# Patient Record
Sex: FEMALE | Race: OTHER | HISPANIC OR LATINO | Employment: UNEMPLOYED | URBAN - METROPOLITAN AREA
[De-identification: names, ages, dates, MRNs, and addresses within clinical notes are randomized per-mention and may not be internally consistent; named-entity substitution may affect disease eponyms.]

---

## 2022-02-14 PROBLEM — R30.0 DYSURIA: Status: ACTIVE | Noted: 2022-02-14

## 2022-02-14 PROBLEM — R59.0 AXILLARY LYMPHADENOPATHY: Status: ACTIVE | Noted: 2022-02-14

## 2022-02-14 PROBLEM — N64.4 BREAST PAIN: Status: ACTIVE | Noted: 2022-02-14

## 2022-02-14 PROBLEM — N89.8 VAGINAL DISCHARGE: Status: ACTIVE | Noted: 2022-02-14

## 2023-09-06 ENCOUNTER — OUTSIDE PLACE OF SERVICE (OUTPATIENT)
Dept: PEDIATRIC CARDIOLOGY | Facility: CLINIC | Age: 23
End: 2023-09-06

## 2023-09-06 NOTE — PROGRESS NOTES
[facility]    OB: Dr. Giorgi Hernández  MFM: Dr. Malachi Cazares    [facility]  Today, I had the pleasure of evaluating Briana Dia who is now 23 y.o. and carrying her second pregnancy at 24w2d gestation with an RODERICK of 2023. She was referred for evaluation of the fetal heart due to a suspected ventricular septal defect.      She is carrying a male fetus, named Exalte.      Obstetric History:    .  Her OB history is otherwise unremarkable.     No past medical history on file.      Current Outpatient Medications:     butalbital-acetaminophen-caffeine -40 mg (FIORICET, ESGIC) -40 mg per tablet, Take 1 tablet by mouth every 4 (four) hours as needed for Pain., Disp: 30 tablet, Rfl: 3    ondansetron (ZOFRAN-ODT) 4 MG TbDL, Take 1 tablet (4 mg total) by mouth every 6 (six) hours as needed., Disp: 30 tablet, Rfl: 5    Family History: Negative for congenital heart disease, early coronary artery disease, sudden unexplained death, connective tissues disorders, genetic syndromes, multiple miscarriages or other congenital anomalies.      FETAL ECHOCARDIOGRAM (summary):  S,D,S  Grossly structurally normal fetal heart with normally related great arteries.   There is a normal fetal foramen ovale with right to left flow.   Normal atrioventricular valve size with no significant atrioventricular valve insufficiency.   Good cardiac contractility.   Normal semilunar valve size and function.   The ductus arteriosus was visualized with right to left flow.   Normal branch pulmonary artery size.   The aortic arch appeared normal in size without obstruction.   Normal systemic venous drainage.   One pulmonary vein from each side noted to drain to the left atrium.   No pericardial effusion.       Impression:  Single active male fetus at 24w2d gestation.  Normal fetal echocardiogram.      Todays fetal echocardiogram is normal, within the limitations of fetal  echocardiography.  I discussed with her that fetal echocardiography is insufficiently sensitive to rule out all septal defects, anomalies of pulmonary and systemic veins, arch anomalies, and some valvar abnormalities, nor can it ensure that the ductus arteriosus and foramen ovale will spontaneously close.     Recommendations:  Location, timing, and mode of delivery will be determined by the obstetrical team.  She does not require further follow-up in the fetal echocardiography clinic, but I would be happy to see her again if additional questions or concerns arise.    Should there be any concerns about the baby's heart after birth, a post- echocardiogram and cardiology consultation are recommended.     The above information was discussed in detail including the use of diagrams, with 35 minutes of total face to face time, with greater than 50% with counseling and coordination of care.  The discussion of the diagnosis and treatment options is as described above.      Kathi Chaney MD  Pediatric Cardiology  [id number] [facility]  [place] [address]  Office: [phone]